# Patient Record
Sex: MALE | Race: BLACK OR AFRICAN AMERICAN | NOT HISPANIC OR LATINO | Employment: FULL TIME | URBAN - NONMETROPOLITAN AREA
[De-identification: names, ages, dates, MRNs, and addresses within clinical notes are randomized per-mention and may not be internally consistent; named-entity substitution may affect disease eponyms.]

---

## 2023-12-06 DIAGNOSIS — I50.9 CHF (NYHA CLASS II, ACC/AHA STAGE C) (MULTI): ICD-10-CM

## 2023-12-06 PROBLEM — Z95.810 CARDIAC DEFIBRILLATOR IN PLACE: Status: ACTIVE | Noted: 2023-12-06

## 2023-12-06 PROBLEM — I48.0 PAROXYSMAL ATRIAL FIBRILLATION WITH RAPID VENTRICULAR RESPONSE (MULTI): Status: ACTIVE | Noted: 2023-12-06

## 2023-12-06 PROBLEM — I47.20 PAROXYSMAL VENTRICULAR TACHYCARDIA: Status: ACTIVE | Noted: 2023-12-06

## 2023-12-06 PROBLEM — R60.0 EDEMA, LEG: Status: ACTIVE | Noted: 2023-12-06

## 2023-12-06 PROBLEM — I42.8 NONISCHEMIC CARDIOMYOPATHY (MULTI): Status: ACTIVE | Noted: 2023-12-06

## 2023-12-06 PROBLEM — I47.29 PAROXYSMAL VENTRICULAR TACHYCARDIA (MULTI): Status: ACTIVE | Noted: 2023-12-06

## 2023-12-06 PROBLEM — I10 HTN (HYPERTENSION): Status: ACTIVE | Noted: 2023-12-06

## 2023-12-06 PROBLEM — R06.02 SHORTNESS OF BREATH ON EXERTION: Status: ACTIVE | Noted: 2023-12-06

## 2023-12-06 PROBLEM — I63.9 STROKE (MULTI): Status: ACTIVE | Noted: 2023-12-06

## 2023-12-06 RX ORDER — ESCITALOPRAM OXALATE 10 MG/1
1 TABLET ORAL DAILY
COMMUNITY
End: 2024-04-24 | Stop reason: ALTCHOICE

## 2023-12-06 RX ORDER — SPIRONOLACTONE 25 MG/1
1 TABLET ORAL DAILY
COMMUNITY
Start: 2022-08-17

## 2023-12-06 RX ORDER — CARVEDILOL 12.5 MG/1
1 TABLET ORAL 2 TIMES DAILY
COMMUNITY
Start: 2021-12-01

## 2023-12-06 RX ORDER — FUROSEMIDE 20 MG/1
1 TABLET ORAL 3 TIMES WEEKLY
COMMUNITY
End: 2023-12-06 | Stop reason: SDUPTHER

## 2023-12-06 RX ORDER — SACUBITRIL AND VALSARTAN 24; 26 MG/1; MG/1
1 TABLET, FILM COATED ORAL 2 TIMES DAILY
COMMUNITY

## 2023-12-06 RX ORDER — SEMAGLUTIDE 1.34 MG/ML
INJECTION, SOLUTION SUBCUTANEOUS
COMMUNITY

## 2023-12-06 RX ORDER — ROSUVASTATIN CALCIUM 10 MG/1
1 TABLET, COATED ORAL NIGHTLY
COMMUNITY

## 2023-12-06 RX ORDER — FUROSEMIDE 20 MG/1
20 TABLET ORAL 3 TIMES WEEKLY
Qty: 36 TABLET | Refills: 3 | Status: SHIPPED | OUTPATIENT
Start: 2023-12-06 | End: 2024-04-24 | Stop reason: SDUPTHER

## 2024-04-22 LAB
NON-UH HIE B-TYPE NATRIURETIC PEPTIDE: 197 PG/ML (ref 5–100)
NON-UH HIE CHOL/HDL RATIO: 2.3
NON-UH HIE CHOLESTEROL: 151 MG/DL (ref 140–200)
NON-UH HIE HDL CHOLESTEROL: 66 MG/DL (ref 23–92)
NON-UH HIE LDL CHOLESTEROL,CALCULATED: 71 MG/DL (ref 0–100)
NON-UH HIE TRIGLYCERIDE W/REFLEX: 70 MG/DL (ref 0–149)
NON-UH HIE VLDL CHOLESTEROL: 14 MG/DL

## 2024-04-24 ENCOUNTER — OFFICE VISIT (OUTPATIENT)
Dept: CARDIOLOGY | Facility: CLINIC | Age: 69
End: 2024-04-24
Payer: MEDICARE

## 2024-04-24 VITALS
WEIGHT: 229 LBS | HEIGHT: 74 IN | DIASTOLIC BLOOD PRESSURE: 74 MMHG | HEART RATE: 62 BPM | BODY MASS INDEX: 29.39 KG/M2 | SYSTOLIC BLOOD PRESSURE: 102 MMHG

## 2024-04-24 DIAGNOSIS — I50.9 CHF (NYHA CLASS II, ACC/AHA STAGE C) (MULTI): ICD-10-CM

## 2024-04-24 DIAGNOSIS — I63.9 CEREBROVASCULAR ACCIDENT (CVA), UNSPECIFIED MECHANISM (MULTI): ICD-10-CM

## 2024-04-24 DIAGNOSIS — I48.0 PAROXYSMAL ATRIAL FIBRILLATION WITH RAPID VENTRICULAR RESPONSE (MULTI): ICD-10-CM

## 2024-04-24 DIAGNOSIS — I42.8 NONISCHEMIC CARDIOMYOPATHY (MULTI): ICD-10-CM

## 2024-04-24 DIAGNOSIS — Z78.9 NEVER SMOKED CIGARETTES: ICD-10-CM

## 2024-04-24 DIAGNOSIS — Z95.810 CARDIAC DEFIBRILLATOR IN PLACE: ICD-10-CM

## 2024-04-24 PROCEDURE — 3078F DIAST BP <80 MM HG: CPT | Performed by: INTERNAL MEDICINE

## 2024-04-24 PROCEDURE — 1036F TOBACCO NON-USER: CPT | Performed by: INTERNAL MEDICINE

## 2024-04-24 PROCEDURE — 1160F RVW MEDS BY RX/DR IN RCRD: CPT | Performed by: INTERNAL MEDICINE

## 2024-04-24 PROCEDURE — 1159F MED LIST DOCD IN RCRD: CPT | Performed by: INTERNAL MEDICINE

## 2024-04-24 PROCEDURE — 99214 OFFICE O/P EST MOD 30 MIN: CPT | Performed by: INTERNAL MEDICINE

## 2024-04-24 PROCEDURE — 3074F SYST BP LT 130 MM HG: CPT | Performed by: INTERNAL MEDICINE

## 2024-04-24 RX ORDER — FUROSEMIDE 20 MG/1
20 TABLET ORAL DAILY
Qty: 90 TABLET | Refills: 3 | Status: SHIPPED | OUTPATIENT
Start: 2024-04-24 | End: 2025-04-24

## 2024-04-24 NOTE — PATIENT INSTRUCTIONS
Please bring all medicines, vitamins, and herbal supplements with you when you come to the office.    Prescriptions will not be filled unless you are compliant with your follow up appointments or have a follow up appointment scheduled as per instruction of your physician. Refills should be requested at the time of your visit.     BMI was above normal measurement. Current weight: 104 kg (229 lb)  Weight change since last visit (-) denotes wt loss 5 lbs   Weight loss needed to achieve BMI 25: 34.7 Lbs  Weight loss needed to achieve BMI 30: -4.2 Lbs  Provided instructions on dietary changes  Provided instructions on exercise.    Pacemaker/Defibrillator follow up per routine.

## 2024-04-24 NOTE — LETTER
"April 24, 2024     James Pérez MD  [address] [facility] [address] [facility] [address]  [facility] [address]    Patient: Vicente Smart Jr.   YOB: 1955   Date of Visit: 4/24/2024       Dear Dr. James Pérez MD:    Thank you for referring Vicente Smart to me for evaluation. Below are my notes for this consultation.  If you have questions, please do not hesitate to call me. I look forward to following your patient along with you.       Sincerely,     Yohannes Moreno, DO      CC: No Recipients  ______________________________________________________________________________________    Subjective   Vicente Smart Jr. is a 69 y.o. male       Chief Complaint    Follow-up          69-year-old gentleman returns for routine follow-up and doing very well.  He continues working out on a regular basis by means of rowing, elliptical, weights, and walking 2 miles daily.  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  He has recovered from this very nicely.  He remains on appropriate GDMT for severe nonischemic cardiomyopathy, paroxysmal A-fib.  He does have defibrillator with no events, device report from March is reviewed    We reviewed all medications, discussed his workout activities; NYHA class I stage C currently.    Recommendations, follow-up in 6 months obtain appropriate labs from his primary care physician, continue routine surveillance.         Review of Systems   All other systems reviewed and are negative.           Vitals:    04/24/24 1146   BP: 102/74   BP Location: Right arm   Patient Position: Sitting   Pulse: 62   Weight: 104 kg (229 lb)   Height: 1.88 m (6' 2\")        Objective   Physical Exam  Constitutional:       Appearance: Normal appearance.   HENT:      Nose: Nose normal.   Neck:      Vascular: No carotid bruit.   Cardiovascular:      Rate and Rhythm: Normal rate.      Pulses: Normal pulses.      Heart sounds: Normal heart sounds.   Pulmonary:      Effort: " Pulmonary effort is normal.   Abdominal:      General: Bowel sounds are normal.      Palpations: Abdomen is soft.   Musculoskeletal:         General: Normal range of motion.      Cervical back: Normal range of motion.      Right lower leg: No edema.      Left lower leg: No edema.   Skin:     General: Skin is warm and dry.   Neurological:      General: No focal deficit present.      Mental Status: He is alert.   Psychiatric:         Mood and Affect: Mood normal.         Behavior: Behavior normal.         Thought Content: Thought content normal.         Judgment: Judgment normal.         Allergies  Patient has no known allergies.     Current Medications    Current Outpatient Medications:   •  apixaban (Eliquis) 5 mg tablet, Take 1 tablet (5 mg) by mouth 2 times a day., Disp: , Rfl:   •  carvedilol (Coreg) 12.5 mg tablet, Take 1 tablet (12.5 mg) by mouth 2 times a day., Disp: , Rfl:   •  empagliflozin (Jardiance) 10 mg, Take 1 tablet (10 mg) by mouth once daily., Disp: , Rfl:   •  furosemide (Lasix) 20 mg tablet, Take 1 tablet (20 mg) by mouth 3 times a week., Disp: 36 tablet, Rfl: 3  •  rosuvastatin (Crestor) 10 mg tablet, Take 1 tablet (10 mg) by mouth once daily at bedtime., Disp: , Rfl:   •  sacubitriL-valsartan (Entresto) 24-26 mg tablet, Take 1 tablet by mouth 2 times a day., Disp: , Rfl:   •  semaglutide (Ozempic) 0.25 mg or 0.5 mg(2 mg/1.5 mL) pen injector, Inject under the skin. As directed, Disp: , Rfl:   •  spironolactone (Aldactone) 25 mg tablet, Take 1 tablet (25 mg) by mouth once daily., Disp: , Rfl:                      Assessment/Plan   1. Nonischemic cardiomyopathy (Multi)        2. Paroxysmal atrial fibrillation with rapid ventricular response (Multi)        3. CHF (NYHA class II, ACC/AHA stage C) (Multi)        4. Cardiac defibrillator in place        5. Cerebrovascular accident (CVA), unspecified mechanism (Multi)        6. Never smoked cigarettes                 Scribe Attestation  By signing my  name below, I, Mathew Becerra LPNibe   attest that this documentation has been prepared under the direction and in the presence of Yohannes Moreno DO.     Provider Attestation - Scribe documentation    All medical record entries made by the Scribe were at my direction and personally dictated by me. I have reviewed the chart and agree that the record accurately reflects my personal performance of the history, physical exam, discussion and plan.

## 2024-04-24 NOTE — PROGRESS NOTES
"Subjective   Vicente Smart Jr. is a 69 y.o. male       Chief Complaint    Follow-up          69-year-old gentleman returns for routine follow-up and doing very well.  He continues working out on a regular basis by means of rowing, elliptical, weights, and walking 2 miles daily.  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  He has recovered from this very nicely.  He remains on appropriate GDMT for severe nonischemic cardiomyopathy, paroxysmal A-fib.  He does have defibrillator with no events, device report from March is reviewed    We reviewed all medications, discussed his workout activities; NYHA class I stage C currently.    Recommendations, follow-up in 6 months obtain appropriate labs from his primary care physician, continue routine surveillance.         Review of Systems   All other systems reviewed and are negative.           Vitals:    04/24/24 1146   BP: 102/74   BP Location: Right arm   Patient Position: Sitting   Pulse: 62   Weight: 104 kg (229 lb)   Height: 1.88 m (6' 2\")        Objective   Physical Exam  Constitutional:       Appearance: Normal appearance.   HENT:      Nose: Nose normal.   Neck:      Vascular: No carotid bruit.   Cardiovascular:      Rate and Rhythm: Normal rate.      Pulses: Normal pulses.      Heart sounds: Normal heart sounds.   Pulmonary:      Effort: Pulmonary effort is normal.   Abdominal:      General: Bowel sounds are normal.      Palpations: Abdomen is soft.   Musculoskeletal:         General: Normal range of motion.      Cervical back: Normal range of motion.      Right lower leg: No edema.      Left lower leg: No edema.   Skin:     General: Skin is warm and dry.   Neurological:      General: No focal deficit present.      Mental Status: He is alert.   Psychiatric:         Mood and Affect: Mood normal.         Behavior: Behavior normal.         Thought Content: Thought content normal.         Judgment: Judgment normal. "         Allergies  Patient has no known allergies.     Current Medications    Current Outpatient Medications:     apixaban (Eliquis) 5 mg tablet, Take 1 tablet (5 mg) by mouth 2 times a day., Disp: , Rfl:     carvedilol (Coreg) 12.5 mg tablet, Take 1 tablet (12.5 mg) by mouth 2 times a day., Disp: , Rfl:     empagliflozin (Jardiance) 10 mg, Take 1 tablet (10 mg) by mouth once daily., Disp: , Rfl:     furosemide (Lasix) 20 mg tablet, Take 1 tablet (20 mg) by mouth 3 times a week., Disp: 36 tablet, Rfl: 3    rosuvastatin (Crestor) 10 mg tablet, Take 1 tablet (10 mg) by mouth once daily at bedtime., Disp: , Rfl:     sacubitriL-valsartan (Entresto) 24-26 mg tablet, Take 1 tablet by mouth 2 times a day., Disp: , Rfl:     semaglutide (Ozempic) 0.25 mg or 0.5 mg(2 mg/1.5 mL) pen injector, Inject under the skin. As directed, Disp: , Rfl:     spironolactone (Aldactone) 25 mg tablet, Take 1 tablet (25 mg) by mouth once daily., Disp: , Rfl:                      Assessment/Plan   1. Nonischemic cardiomyopathy (Multi)        2. Paroxysmal atrial fibrillation with rapid ventricular response (Multi)        3. CHF (NYHA class II, ACC/AHA stage C) (Multi)        4. Cardiac defibrillator in place        5. Cerebrovascular accident (CVA), unspecified mechanism (Multi)        6. Never smoked cigarettes                 Scribe Attestation  By signing my name below, Fanny POWELL LPN, Scribe   attest that this documentation has been prepared under the direction and in the presence of Yohannes Moreno DO.     Provider Attestation - Scribe documentation    All medical record entries made by the Scribe were at my direction and personally dictated by me. I have reviewed the chart and agree that the record accurately reflects my personal performance of the history, physical exam, discussion and plan.

## 2024-06-06 PROCEDURE — 93283 PRGRMG EVAL IMPLANTABLE DFB: CPT | Performed by: INTERNAL MEDICINE

## 2024-10-22 LAB
NON-UH HIE ANION GAP: 10.9 (ref 6–15)
NON-UH HIE B-TYPE NATRIURETIC PEPTIDE: 94 PG/ML (ref 5–100)
NON-UH HIE BLOOD UREA NITROGEN: 24 MG/DL (ref 7–25)
NON-UH HIE CALCIUM: 9.6 MG/DL (ref 8.6–10.3)
NON-UH HIE CARBON DIOXIDE: 26.9 MMOL/L (ref 21–31)
NON-UH HIE CHLORIDE: 102 MMOL/L (ref 98–107)
NON-UH HIE CREATININE: 1.43 MG/DL (ref 0.7–1.3)
NON-UH HIE ESTIMATED GFR: 53.37
NON-UH HIE GLUCOSE: 90 MG/DL (ref 70–100)
NON-UH HIE POTASSIUM: 4.8 MMOL/L (ref 3.5–5.1)
NON-UH HIE SODIUM: 135 MMOL/L (ref 136–145)

## 2024-10-24 ENCOUNTER — APPOINTMENT (OUTPATIENT)
Dept: CARDIOLOGY | Facility: CLINIC | Age: 69
End: 2024-10-24
Payer: MEDICARE

## 2024-10-24 VITALS
WEIGHT: 232 LBS | SYSTOLIC BLOOD PRESSURE: 118 MMHG | DIASTOLIC BLOOD PRESSURE: 70 MMHG | HEIGHT: 74 IN | BODY MASS INDEX: 29.77 KG/M2 | HEART RATE: 78 BPM

## 2024-10-24 DIAGNOSIS — I47.29 PAROXYSMAL VENTRICULAR TACHYCARDIA (MULTI): ICD-10-CM

## 2024-10-24 DIAGNOSIS — Z95.810 CARDIAC DEFIBRILLATOR IN PLACE: ICD-10-CM

## 2024-10-24 DIAGNOSIS — I48.0 PAROXYSMAL ATRIAL FIBRILLATION WITH RAPID VENTRICULAR RESPONSE (MULTI): ICD-10-CM

## 2024-10-24 DIAGNOSIS — I63.9 CEREBROVASCULAR ACCIDENT (CVA), UNSPECIFIED MECHANISM (MULTI): ICD-10-CM

## 2024-10-24 DIAGNOSIS — Z78.9 NEVER SMOKED CIGARETTES: ICD-10-CM

## 2024-10-24 DIAGNOSIS — I42.8 NONISCHEMIC CARDIOMYOPATHY (MULTI): ICD-10-CM

## 2024-10-24 DIAGNOSIS — I10 PRIMARY HYPERTENSION: ICD-10-CM

## 2024-10-24 DIAGNOSIS — I50.9 CHF (NYHA CLASS II, ACC/AHA STAGE C) (MULTI): ICD-10-CM

## 2024-10-24 PROCEDURE — 1036F TOBACCO NON-USER: CPT | Performed by: INTERNAL MEDICINE

## 2024-10-24 PROCEDURE — 3074F SYST BP LT 130 MM HG: CPT | Performed by: INTERNAL MEDICINE

## 2024-10-24 PROCEDURE — 3078F DIAST BP <80 MM HG: CPT | Performed by: INTERNAL MEDICINE

## 2024-10-24 PROCEDURE — 1160F RVW MEDS BY RX/DR IN RCRD: CPT | Performed by: INTERNAL MEDICINE

## 2024-10-24 PROCEDURE — 3008F BODY MASS INDEX DOCD: CPT | Performed by: INTERNAL MEDICINE

## 2024-10-24 PROCEDURE — 1159F MED LIST DOCD IN RCRD: CPT | Performed by: INTERNAL MEDICINE

## 2024-10-24 PROCEDURE — 99214 OFFICE O/P EST MOD 30 MIN: CPT | Performed by: INTERNAL MEDICINE

## 2024-10-24 NOTE — PROGRESS NOTES
"Subjective   Vicente Smart Jr. is a 69 y.o. 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  His current NYHA class is I/C.    We continue to treat him for nonischemic cardiomyopathy, history of stroke, paroxysmal atrial fibrillation, history of AICD.  Recent device check did not reveal any arrhythmias and battery is in good shape.    He remains on appropriate GDMT as reviewed, recent laboratories are reviewed, BNP is 94, renal function is normal    Recommendations: Continue current therapies and follow-up in 8 months         Review of Systems   All other systems reviewed and are negative.           Vitals:    10/24/24 1056   BP: 118/70   BP Location: Left arm   Patient Position: Sitting   Pulse: 78   Weight: 105 kg (232 lb)   Height: 1.88 m (6' 2\")        Objective   Physical Exam  Constitutional:       Appearance: Normal appearance.   HENT:      Nose: Nose normal.   Neck:      Vascular: No carotid bruit.   Cardiovascular:      Rate and Rhythm: Normal rate.      Pulses: Normal pulses.      Heart sounds: Normal heart sounds.   Pulmonary:      Effort: Pulmonary effort is normal.   Abdominal:      General: Bowel sounds are normal.      Palpations: Abdomen is soft.   Musculoskeletal:         General: Normal range of motion.      Cervical back: Normal range of motion.      Right lower leg: No edema.      Left lower leg: No edema.   Skin:     General: Skin is warm and dry.   Neurological:      General: No focal deficit present.      Mental Status: He is alert.   Psychiatric:         Mood and Affect: Mood normal.         Behavior: Behavior normal.         Thought Content: Thought content normal.         Judgment: Judgment normal.         Allergies  Patient has no known allergies.     Current Medications    Current " Outpatient Medications:     apixaban (Eliquis) 5 mg tablet, Take 1 tablet (5 mg) by mouth 2 times a day., Disp: , Rfl:     carvedilol (Coreg) 12.5 mg tablet, Take 1 tablet (12.5 mg) by mouth 2 times a day., Disp: , Rfl:     empagliflozin (Jardiance) 10 mg, Take 1 tablet (10 mg) by mouth once daily., Disp: , Rfl:     furosemide (Lasix) 20 mg tablet, Take 1 tablet (20 mg) by mouth once daily., Disp: 90 tablet, Rfl: 3    rosuvastatin (Crestor) 10 mg tablet, Take 1 tablet (10 mg) by mouth once daily at bedtime., Disp: , Rfl:     sacubitriL-valsartan (Entresto) 24-26 mg tablet, Take 1 tablet by mouth 2 times a day., Disp: , Rfl:     semaglutide (Ozempic) 0.25 mg or 0.5 mg(2 mg/1.5 mL) pen injector, Inject under the skin. As directed, Disp: , Rfl:     spironolactone (Aldactone) 25 mg tablet, Take 1 tablet (25 mg) by mouth once daily., Disp: , Rfl:                      Assessment/Plan   1. Nonischemic cardiomyopathy (Multi)  Follow Up In Cardiology      2. CHF (NYHA class II, ACC/AHA stage C) (Multi)        3. Primary hypertension        4. Cardiac defibrillator in place        5. Paroxysmal atrial fibrillation with rapid ventricular response (Multi)        6. Paroxysmal ventricular tachycardia (Multi)        7. Cerebrovascular accident (CVA), unspecified mechanism (Multi)        8. BMI 29.0-29.9,adult        9. Never smoked cigarettes                 Scribe Attestation  By signing my name below, IDelfina LPN, Scribe   attest that this documentation has been prepared under the direction and in the presence of Yohannes Moreno DO.     Provider Attestation - Scribe documentation    All medical record entries made by the Scribe were at my direction and personally dictated by me. I have reviewed the chart and agree that the record accurately reflects my personal performance of the history, physical exam, discussion and plan.

## 2024-10-24 NOTE — PATIENT INSTRUCTIONS
Please bring all medicines, vitamins, and herbal supplements with you when you come to the office.    Prescriptions will not be filled unless you are compliant with your follow up appointments or have a follow up appointment scheduled as per instruction of your physician. Refills should be requested at the time of your visit.     BMI was above normal measurement. Current weight: 105 kg (232 lb)  Weight change since last visit (-) denotes wt loss 3 lbs   Weight loss needed to achieve BMI 25: 37.7 Lbs  Weight loss needed to achieve BMI 30: -1.2 Lbs  Provided instructions on dietary changes  Provided instructions on exercise.    Pacemaker/Defibrillator follow up per routine

## 2024-10-24 NOTE — LETTER
"October 24, 2024     James Pérez MD  [address] [facility] [address] [facility] [address]  [facility] [address]    Patient: Vicente Smart Jr.   YOB: 1955   Date of Visit: 10/24/2024       Dear Dr. James Pérez MD:    Thank you for referring Vicente Smart to me for evaluation. Below are my notes for this consultation.  If you have questions, please do not hesitate to call me. I look forward to following your patient along with you.       Sincerely,     Yohannes Moreno, DO      CC: No Recipients  ______________________________________________________________________________________    Subjective   Vicente Smart Jr. is a 69 y.o. 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  His current NYHA class is I/C.    We continue to treat him for nonischemic cardiomyopathy, history of stroke, paroxysmal atrial fibrillation, history of AICD.  Recent device check did not reveal any arrhythmias and battery is in good shape.    He remains on appropriate GDMT as reviewed, recent laboratories are reviewed, BNP is 94, renal function is normal    Recommendations: Continue current therapies and follow-up in 8 months         Review of Systems   All other systems reviewed and are negative.           Vitals:    10/24/24 1056   BP: 118/70   BP Location: Left arm   Patient Position: Sitting   Pulse: 78   Weight: 105 kg (232 lb)   Height: 1.88 m (6' 2\")        Objective   Physical Exam  Constitutional:       Appearance: Normal appearance.   HENT:      Nose: Nose normal.   Neck:      Vascular: No carotid bruit.   Cardiovascular:      Rate and Rhythm: Normal rate.      Pulses: Normal pulses.      Heart sounds: Normal heart sounds.   Pulmonary:      Effort: Pulmonary effort is normal.   Abdominal:      General: Bowel sounds are normal.      " Palpations: Abdomen is soft.   Musculoskeletal:         General: Normal range of motion.      Cervical back: Normal range of motion.      Right lower leg: No edema.      Left lower leg: No edema.   Skin:     General: Skin is warm and dry.   Neurological:      General: No focal deficit present.      Mental Status: He is alert.   Psychiatric:         Mood and Affect: Mood normal.         Behavior: Behavior normal.         Thought Content: Thought content normal.         Judgment: Judgment normal.         Allergies  Patient has no known allergies.     Current Medications    Current Outpatient Medications:   •  apixaban (Eliquis) 5 mg tablet, Take 1 tablet (5 mg) by mouth 2 times a day., Disp: , Rfl:   •  carvedilol (Coreg) 12.5 mg tablet, Take 1 tablet (12.5 mg) by mouth 2 times a day., Disp: , Rfl:   •  empagliflozin (Jardiance) 10 mg, Take 1 tablet (10 mg) by mouth once daily., Disp: , Rfl:   •  furosemide (Lasix) 20 mg tablet, Take 1 tablet (20 mg) by mouth once daily., Disp: 90 tablet, Rfl: 3  •  rosuvastatin (Crestor) 10 mg tablet, Take 1 tablet (10 mg) by mouth once daily at bedtime., Disp: , Rfl:   •  sacubitriL-valsartan (Entresto) 24-26 mg tablet, Take 1 tablet by mouth 2 times a day., Disp: , Rfl:   •  semaglutide (Ozempic) 0.25 mg or 0.5 mg(2 mg/1.5 mL) pen injector, Inject under the skin. As directed, Disp: , Rfl:   •  spironolactone (Aldactone) 25 mg tablet, Take 1 tablet (25 mg) by mouth once daily., Disp: , Rfl:                      Assessment/Plan   1. Nonischemic cardiomyopathy (Multi)  Follow Up In Cardiology      2. CHF (NYHA class II, ACC/AHA stage C) (Multi)        3. Primary hypertension        4. Cardiac defibrillator in place        5. Paroxysmal atrial fibrillation with rapid ventricular response (Multi)        6. Paroxysmal ventricular tachycardia (Multi)        7. Cerebrovascular accident (CVA), unspecified mechanism (Multi)        8. BMI 29.0-29.9,adult        9. Never smoked cigarettes                  Scribe Attestation  By signing my name below, I, Mathew Jain LPNibcrystal   attest that this documentation has been prepared under the direction and in the presence of Yohannes Moreno DO.     Provider Attestation - Scribe documentation    All medical record entries made by the Scribe were at my direction and personally dictated by me. I have reviewed the chart and agree that the record accurately reflects my personal performance of the history, physical exam, discussion and plan.

## 2024-11-07 ENCOUNTER — TELEPHONE (OUTPATIENT)
Dept: CARDIOLOGY | Facility: CLINIC | Age: 69
End: 2024-11-07
Payer: MEDICARE

## 2024-11-07 NOTE — TELEPHONE ENCOUNTER
Patient phoned would like his Pacemaker device checks to be scheduled with [facility], currently they are scheduled with [facility]. Orders in chart from  on 10/24/24. Will need to be scheduled in December with pacemaker clinic.   To [facility] Clerical for follow up scheduling.

## 2025-06-03 ENCOUNTER — APPOINTMENT (OUTPATIENT)
Dept: CARDIOLOGY | Facility: CLINIC | Age: 70
End: 2025-06-03
Payer: MEDICARE

## 2025-06-06 ENCOUNTER — TELEPHONE (OUTPATIENT)
Dept: CARDIOLOGY | Facility: CLINIC | Age: 70
End: 2025-06-06
Payer: MEDICARE

## 2025-06-06 NOTE — TELEPHONE ENCOUNTER
Voicemail on nurse line wanting to reschedule his missed appt.     To [facility] clerical to call patient.

## 2025-08-12 ENCOUNTER — OFFICE VISIT (OUTPATIENT)
Dept: CARDIOLOGY | Facility: CLINIC | Age: 70
End: 2025-08-12
Payer: MEDICARE

## 2025-08-12 VITALS
DIASTOLIC BLOOD PRESSURE: 60 MMHG | HEIGHT: 74 IN | SYSTOLIC BLOOD PRESSURE: 108 MMHG | HEART RATE: 76 BPM | BODY MASS INDEX: 29.67 KG/M2 | WEIGHT: 231.2 LBS

## 2025-08-12 DIAGNOSIS — I50.9 CONGESTIVE HEART FAILURE, NYHA CLASS 1, UNSPECIFIED CONGESTIVE HEART FAILURE TYPE: ICD-10-CM

## 2025-08-12 DIAGNOSIS — I63.9 CEREBROVASCULAR ACCIDENT (CVA), UNSPECIFIED MECHANISM (MULTI): ICD-10-CM

## 2025-08-12 DIAGNOSIS — Z95.810 CARDIAC DEFIBRILLATOR IN PLACE: ICD-10-CM

## 2025-08-12 DIAGNOSIS — I48.0 PAROXYSMAL ATRIAL FIBRILLATION WITH RAPID VENTRICULAR RESPONSE (MULTI): ICD-10-CM

## 2025-08-12 DIAGNOSIS — I42.8 NONISCHEMIC CARDIOMYOPATHY (MULTI): ICD-10-CM

## 2025-08-12 DIAGNOSIS — Z78.9 NEVER SMOKED CIGARETTES: ICD-10-CM

## 2025-08-12 PROCEDURE — 3008F BODY MASS INDEX DOCD: CPT | Performed by: INTERNAL MEDICINE

## 2025-08-12 PROCEDURE — 1036F TOBACCO NON-USER: CPT | Performed by: INTERNAL MEDICINE

## 2025-08-12 PROCEDURE — 3074F SYST BP LT 130 MM HG: CPT | Performed by: INTERNAL MEDICINE

## 2025-08-12 PROCEDURE — 3078F DIAST BP <80 MM HG: CPT | Performed by: INTERNAL MEDICINE

## 2025-08-12 PROCEDURE — 99213 OFFICE O/P EST LOW 20 MIN: CPT | Performed by: INTERNAL MEDICINE

## 2025-08-12 PROCEDURE — 1160F RVW MEDS BY RX/DR IN RCRD: CPT | Performed by: INTERNAL MEDICINE

## 2025-08-12 PROCEDURE — 1159F MED LIST DOCD IN RCRD: CPT | Performed by: INTERNAL MEDICINE

## 2025-08-12 RX ORDER — TAMSULOSIN HYDROCHLORIDE 0.4 MG/1
0.4 CAPSULE ORAL DAILY
COMMUNITY

## 2025-08-28 ENCOUNTER — APPOINTMENT (OUTPATIENT)
Dept: CARDIOLOGY | Facility: CLINIC | Age: 70
End: 2025-08-28
Payer: MEDICARE

## 2026-05-12 ENCOUNTER — APPOINTMENT (OUTPATIENT)
Dept: CARDIOLOGY | Facility: CLINIC | Age: 71
End: 2026-05-12
Payer: MEDICARE